# Patient Record
Sex: MALE | Race: WHITE | NOT HISPANIC OR LATINO | Employment: FULL TIME | URBAN - METROPOLITAN AREA
[De-identification: names, ages, dates, MRNs, and addresses within clinical notes are randomized per-mention and may not be internally consistent; named-entity substitution may affect disease eponyms.]

---

## 2020-05-26 ENCOUNTER — OCCUPATIONAL HEALTH (OUTPATIENT)
Dept: URGENT CARE | Facility: CLINIC | Age: 35
End: 2020-05-26
Payer: COMMERCIAL

## 2020-05-26 DIAGNOSIS — Z02.83 ENCOUNTER FOR DRUG SCREENING: Primary | ICD-10-CM

## 2020-05-26 LAB — BREATH ALCOHOL: 0

## 2020-05-26 PROCEDURE — 80305 DRUG TEST PRSMV DIR OPT OBS: CPT | Mod: S$GLB,,, | Performed by: FAMILY MEDICINE

## 2020-05-26 PROCEDURE — 82075 POCT BREATH ALCOHOL TEST: ICD-10-PCS | Mod: S$GLB,,, | Performed by: FAMILY MEDICINE

## 2020-05-26 PROCEDURE — 82075 ASSAY OF BREATH ETHANOL: CPT | Mod: S$GLB,,, | Performed by: FAMILY MEDICINE

## 2020-05-26 PROCEDURE — 80305 OOH NON-DOT DRUG SCREEN: ICD-10-PCS | Mod: S$GLB,,, | Performed by: FAMILY MEDICINE

## 2020-08-13 ENCOUNTER — LAB VISIT (OUTPATIENT)
Dept: PRIMARY CARE CLINIC | Facility: OTHER | Age: 35
End: 2020-08-13
Attending: INTERNAL MEDICINE
Payer: OTHER GOVERNMENT

## 2020-08-13 DIAGNOSIS — R05.9 COUGH: ICD-10-CM

## 2020-08-13 PROCEDURE — U0003 INFECTIOUS AGENT DETECTION BY NUCLEIC ACID (DNA OR RNA); SEVERE ACUTE RESPIRATORY SYNDROME CORONAVIRUS 2 (SARS-COV-2) (CORONAVIRUS DISEASE [COVID-19]), AMPLIFIED PROBE TECHNIQUE, MAKING USE OF HIGH THROUGHPUT TECHNOLOGIES AS DESCRIBED BY CMS-2020-01-R: HCPCS

## 2020-08-15 LAB — SARS-COV-2 RNA RESP QL NAA+PROBE: NOT DETECTED

## 2020-08-18 DIAGNOSIS — M25.511 RIGHT SHOULDER PAIN: Primary | ICD-10-CM

## 2021-04-29 ENCOUNTER — PATIENT MESSAGE (OUTPATIENT)
Dept: RESEARCH | Facility: HOSPITAL | Age: 36
End: 2021-04-29

## 2025-07-01 ENCOUNTER — HOSPITAL ENCOUNTER (EMERGENCY)
Facility: HOSPITAL | Age: 40
Discharge: HOME | End: 2025-07-02
Attending: EMERGENCY MEDICINE
Payer: MEDICAID

## 2025-07-01 DIAGNOSIS — T14.8XXA PUNCTURE WOUND: ICD-10-CM

## 2025-07-01 PROCEDURE — 99284 EMERGENCY DEPT VISIT MOD MDM: CPT

## 2025-07-02 VITALS
DIASTOLIC BLOOD PRESSURE: 77 MMHG | WEIGHT: 311 LBS | RESPIRATION RATE: 18 BRPM | BODY MASS INDEX: 43.54 KG/M2 | HEART RATE: 98 BPM | OXYGEN SATURATION: 98 % | SYSTOLIC BLOOD PRESSURE: 168 MMHG | HEIGHT: 71 IN | TEMPERATURE: 99 F

## 2025-07-02 PROCEDURE — 90471 IMMUNIZATION ADMIN: CPT | Performed by: EMERGENCY MEDICINE

## 2025-07-02 PROCEDURE — 25000003 PHARM REV CODE 250: Performed by: EMERGENCY MEDICINE

## 2025-07-02 PROCEDURE — 63600175 PHARM REV CODE 636 W HCPCS: Performed by: EMERGENCY MEDICINE

## 2025-07-02 PROCEDURE — 90715 TDAP VACCINE 7 YRS/> IM: CPT | Performed by: EMERGENCY MEDICINE

## 2025-07-02 RX ORDER — DOXYCYCLINE 100 MG/1
100 CAPSULE ORAL
Status: COMPLETED | OUTPATIENT
Start: 2025-07-02 | End: 2025-07-02

## 2025-07-02 RX ORDER — BACITRACIN 500 [USP'U]/G
OINTMENT TOPICAL
Status: COMPLETED | OUTPATIENT
Start: 2025-07-02 | End: 2025-07-02

## 2025-07-02 RX ORDER — DOXYCYCLINE 100 MG/1
100 CAPSULE ORAL 2 TIMES DAILY
Qty: 7 CAPSULE | Refills: 0 | Status: SHIPPED | OUTPATIENT
Start: 2025-07-02 | End: 2025-07-06

## 2025-07-02 RX ADMIN — DOXYCYCLINE 100 MG: 100 CAPSULE ORAL at 12:07

## 2025-07-02 RX ADMIN — BACITRACIN: 500 OINTMENT TOPICAL at 12:07

## 2025-07-02 RX ADMIN — CLOSTRIDIUM TETANI TOXOID ANTIGEN (FORMALDEHYDE INACTIVATED), CORYNEBACTERIUM DIPHTHERIAE TOXOID ANTIGEN (FORMALDEHYDE INACTIVATED), BORDETELLA PERTUSSIS TOXOID ANTIGEN (GLUTARALDEHYDE INACTIVATED), BORDETELLA PERTUSSIS FILAMENTOUS HEMAGGLUTININ ANTIGEN (FORMALDEHYDE INACTIVATED), BORDETELLA PERTUSSIS PERTACTIN ANTIGEN, AND BORDETELLA PERTUSSIS FIMBRIAE 2/3 ANTIGEN 0.5 ML: 5; 2; 2.5; 5; 3; 5 INJECTION, SUSPENSION INTRAMUSCULAR at 12:07

## 2025-07-02 NOTE — ED PROVIDER NOTES
Chief complaint:  Puncture Wound (Patient states that he was gator hunting in [place] when his LLE was punctured by what he believes to be a piece of rebar)      HPI:  Christpoh Flores is a 40 y.o. male presenting with a puncture wound to the left distal leg above the ankle.  This occurred approximately 1 hour ago while he was standing and water, fishing in [facility].  He felt a sharp piercing sensation and exited the water with apparent puncture wound.  This was stressed at a local fire station and patient presents POV to the emergency department.  He is walking without difficulty.  He denies visualized foreign body although there is some soiling of the wound from the object it appeared to be some metal.    ROS: As per HPI and below:  No numbness, weakness, other fall or injury, ankle or foot pain.    Review of patient's allergies indicates:  No Known Allergies    Discharge Medication List as of 7/2/2025  1:06 AM        START taking these medications    Details   doxycycline (VIBRAMYCIN) 100 MG Cap Take 1 capsule (100 mg total) by mouth 2 (two) times daily. for 4 days, Starting Wed 7/2/2025, Until Sun 7/6/2025, Normal           CONTINUE these medications which have NOT CHANGED    Details   naproxen (NAPROSYN) 500 MG tablet Take 1 tablet (500 mg total) by mouth 2 (two) times daily with meals., Starting Mon 9/2/2019, Print             PMH:  As per HPI and below:  No past medical history on file.  No past surgical history on file.    Social History     Socioeconomic History    Marital status:        No family history on file.    Physical Exam:    Vitals:    07/02/25 0030   BP: (!) 168/77   Pulse: 98   Resp: 18   Temp:      GENERAL:  No apparent distress.  Alert.    HEENT:  Moist mucous membranes.  Normocephalic and atraumatic.    NECK:  No swelling.  Midline trachea.   CARDIOVASCULAR:  Regular rate and rhythm.  2+ radial pulses.    PULMONARY:  Lungs clear to auscultation bilaterally.  No wheezes, rales, or  rhonci.    EXTREMITIES:  Warm and well perfused.  Brisk capillary refill.  5 mm puncture wound to the left distal leg above the ankle as pictured below.  There is some swelling of the wound by debris.  No visualized foreign body.  No visualized penetration past the fascia, tendon involvement.  2+ DP and PT pulses in the feet with 5/5 strength sensation in the distal lower extremities.  Full active range of motion of the left ankle without restriction or pain.  NEUROLOGICAL:  Normal mental status.  Appropriate and conversant.    SKIN:  Puncture wound as detailed below.              Labs Reviewed - No data to display    Discharge Medication List as of 7/2/2025  1:06 AM        START taking these medications    Details   doxycycline (VIBRAMYCIN) 100 MG Cap Take 1 capsule (100 mg total) by mouth 2 (two) times daily. for 4 days, Starting Wed 7/2/2025, Until Sun 7/6/2025, Normal           CONTINUE these medications which have NOT CHANGED    Details   naproxen (NAPROSYN) 500 MG tablet Take 1 tablet (500 mg total) by mouth 2 (two) times daily with meals., Starting Mon 9/2/2019, Print             Orders Placed This Encounter   Procedures    X-Ray Ankle Complete Left       Imaging Results              X-Ray Ankle Complete Left (Final result)  Result time 07/02/25 00:46:57      Final result by Madeline Heck MD (07/02/25 00:46:57)                   Impression:      No acute findings.      Electronically signed by: Madeline Heck  Date:    07/02/2025  Time:    00:46               Narrative:    EXAMINATION:  XR ANKLE COMPLETE 3 VIEW LEFT    CLINICAL HISTORY:  Other injury of unspecified body region, initial encounter    TECHNIQUE:  AP, lateral and oblique views of the left ankle were performed.    COMPARISON:  None    FINDINGS:  No acute fracture or dislocation.  Joint spaces and ankle mortise are maintained.                                      ED Course as of 07/02/25 0301   Wed Jul 02, 2025   0025 XR L ankle:  No  fracture or dislocation.  No foreign body adjacent to wound above ankle joint.  Incidental dorsal foreign body noted.  Findings are related to patient.    (Independently interpreted by me) [MR]      ED Course User Index  [MR] Kamar Howe MD       MDM:    40 y.o. male with left leg puncture wound.  Prophylactic antibiotic initiated after wound care including irrigation.  No clear sign of foreign body although foreign body still possible and discussed with the patient.  High-risk for infection given nature wound discussed.  He is to follow up for reassessment.  Tetanus immunization updated.  He declines analgesia.  No distal neurovascular compromise or sign of significant tendon injury.  Return precautions reviewed.    Diagnoses:    1. Left leg puncture wound       Kamar Howe MD  07/02/25 030

## 2025-07-03 ENCOUNTER — HOSPITAL ENCOUNTER (EMERGENCY)
Facility: HOSPITAL | Age: 40
Discharge: HOME | End: 2025-07-03
Attending: STUDENT IN AN ORGANIZED HEALTH CARE EDUCATION/TRAINING PROGRAM
Payer: MEDICAID

## 2025-07-03 VITALS
WEIGHT: 311 LBS | HEIGHT: 71 IN | TEMPERATURE: 98 F | DIASTOLIC BLOOD PRESSURE: 138 MMHG | OXYGEN SATURATION: 98 % | SYSTOLIC BLOOD PRESSURE: 180 MMHG | HEART RATE: 110 BPM | RESPIRATION RATE: 18 BRPM | BODY MASS INDEX: 43.54 KG/M2

## 2025-07-03 DIAGNOSIS — L03.116 CELLULITIS OF LEFT LEG: Primary | ICD-10-CM

## 2025-07-03 DIAGNOSIS — S91.302A OPEN WOUND OF LEFT FOOT: ICD-10-CM

## 2025-07-03 LAB
ABSOLUTE EOSINOPHIL (SMH): 0.01 K/UL
ABSOLUTE MONOCYTE (SMH): 2.4 K/UL (ref 0.3–1)
ABSOLUTE NEUTROPHIL COUNT (SMH): 8.3 K/UL (ref 1.8–7.7)
ALBUMIN SERPL-MCNC: 4.5 G/DL (ref 3.5–5.2)
ALP SERPL-CCNC: 50 UNIT/L (ref 55–135)
ALT SERPL-CCNC: 74 UNIT/L (ref 10–44)
ANION GAP (SMH): 13 MMOL/L (ref 8–16)
AST SERPL-CCNC: 32 UNIT/L (ref 10–40)
BASOPHILS # BLD AUTO: 0.04 K/UL
BASOPHILS NFR BLD AUTO: 0.3 %
BILIRUB SERPL-MCNC: 1.3 MG/DL (ref 0.1–1)
BILIRUB UR QL STRIP.AUTO: NEGATIVE
BUN SERPL-MCNC: 8 MG/DL (ref 6–20)
CALCIUM SERPL-MCNC: 9.3 MG/DL (ref 8.7–10.5)
CHLORIDE SERPL-SCNC: 98 MMOL/L (ref 95–110)
CLARITY UR: CLEAR
CO2 SERPL-SCNC: 20 MMOL/L (ref 23–29)
COLOR UR AUTO: ABNORMAL
CREAT SERPL-MCNC: 1.1 MG/DL (ref 0.5–1.4)
ERYTHROCYTE [DISTWIDTH] IN BLOOD BY AUTOMATED COUNT: 12.8 % (ref 11.5–14.5)
GFR SERPLBLD CREATININE-BSD FMLA CKD-EPI: >60 ML/MIN/1.73/M2
GLUCOSE SERPL-MCNC: 164 MG/DL (ref 70–110)
GLUCOSE UR QL STRIP: NEGATIVE
HCT VFR BLD AUTO: 55.4 % (ref 40–54)
HGB BLD-MCNC: 19.2 GM/DL (ref 14–18)
HGB UR QL STRIP: NEGATIVE
HYALINE CASTS UR QL AUTO: 18 /LPF (ref 0–1)
IMM GRANULOCYTES # BLD AUTO: 0.06 K/UL (ref 0–0.04)
IMM GRANULOCYTES NFR BLD AUTO: 0.5 % (ref 0–0.5)
KETONES UR QL STRIP: NEGATIVE
LDH SERPL L TO P-CCNC: 1.99 MMOL/L (ref 0.5–2.2)
LEUKOCYTE ESTERASE UR QL STRIP: NEGATIVE
LYMPHOCYTES # BLD AUTO: 1.35 K/UL (ref 1–4.8)
MAGNESIUM SERPL-MCNC: 1.9 MG/DL (ref 1.6–2.6)
MCH RBC QN AUTO: 32.3 PG (ref 27–31)
MCHC RBC AUTO-ENTMCNC: 34.7 G/DL (ref 32–36)
MCV RBC AUTO: 93 FL (ref 82–98)
MICROSCOPIC COMMENT: ABNORMAL
NITRITE UR QL STRIP: NEGATIVE
NUCLEATED RBC (/100WBC) (SMH): 0 /100 WBC
PH UR STRIP: 6 [PH]
PHOSPHATE SERPL-MCNC: 2.9 MG/DL (ref 2.7–4.5)
PLATELET # BLD AUTO: 162 K/UL (ref 150–450)
PMV BLD AUTO: 11.2 FL (ref 9.2–12.9)
POTASSIUM SERPL-SCNC: 4.5 MMOL/L (ref 3.5–5.1)
PROT SERPL-MCNC: 8.2 GM/DL (ref 6–8.4)
PROT UR QL STRIP: ABNORMAL
RBC # BLD AUTO: 5.94 M/UL (ref 4.6–6.2)
RBC #/AREA URNS AUTO: 1 /HPF
RELATIVE EOSINOPHIL (SMH): 0.1 % (ref 0–8)
RELATIVE LYMPHOCYTE (SMH): 11.1 % (ref 18–48)
RELATIVE MONOCYTE (SMH): 19.8 % (ref 4–15)
RELATIVE NEUTROPHIL (SMH): 68.2 % (ref 38–73)
SAMPLE: NORMAL
SODIUM SERPL-SCNC: 131 MMOL/L (ref 136–145)
SP GR UR STRIP: 1.02
SQUAMOUS #/AREA URNS AUTO: <1 /HPF
UROBILINOGEN UR STRIP-ACNC: NEGATIVE EU/DL
WBC # BLD AUTO: 12.12 K/UL (ref 3.9–12.7)
WBC #/AREA URNS AUTO: 1 /HPF

## 2025-07-03 PROCEDURE — 82310 ASSAY OF CALCIUM: CPT

## 2025-07-03 PROCEDURE — 84100 ASSAY OF PHOSPHORUS: CPT

## 2025-07-03 PROCEDURE — 36415 COLL VENOUS BLD VENIPUNCTURE: CPT

## 2025-07-03 PROCEDURE — 85025 COMPLETE CBC W/AUTO DIFF WBC: CPT

## 2025-07-03 PROCEDURE — 83735 ASSAY OF MAGNESIUM: CPT

## 2025-07-03 PROCEDURE — 81003 URINALYSIS AUTO W/O SCOPE: CPT

## 2025-07-03 PROCEDURE — 25500020 PHARM REV CODE 255: Performed by: STUDENT IN AN ORGANIZED HEALTH CARE EDUCATION/TRAINING PROGRAM

## 2025-07-03 PROCEDURE — 99285 EMERGENCY DEPT VISIT HI MDM: CPT | Mod: 25

## 2025-07-03 PROCEDURE — 87040 BLOOD CULTURE FOR BACTERIA: CPT

## 2025-07-03 PROCEDURE — 25000003 PHARM REV CODE 250: Performed by: STUDENT IN AN ORGANIZED HEALTH CARE EDUCATION/TRAINING PROGRAM

## 2025-07-03 RX ORDER — LEVOFLOXACIN 750 MG/1
750 TABLET, FILM COATED ORAL DAILY
Qty: 5 TABLET | Refills: 0 | Status: SHIPPED | OUTPATIENT
Start: 2025-07-03 | End: 2025-07-08

## 2025-07-03 RX ORDER — DOXYCYCLINE 100 MG/1
100 CAPSULE ORAL 2 TIMES DAILY
Qty: 6 CAPSULE | Refills: 0 | Status: SHIPPED | OUTPATIENT
Start: 2025-07-03 | End: 2025-07-06

## 2025-07-03 RX ORDER — LEVOFLOXACIN 750 MG/1
750 TABLET, FILM COATED ORAL ONCE
Status: COMPLETED | OUTPATIENT
Start: 2025-07-03 | End: 2025-07-03

## 2025-07-03 RX ORDER — CEPHALEXIN 500 MG/1
500 CAPSULE ORAL 4 TIMES DAILY
Qty: 20 CAPSULE | Refills: 0 | Status: SHIPPED | OUTPATIENT
Start: 2025-07-03 | End: 2025-07-08

## 2025-07-03 RX ORDER — CEPHALEXIN 250 MG/1
500 CAPSULE ORAL ONCE
Status: COMPLETED | OUTPATIENT
Start: 2025-07-03 | End: 2025-07-03

## 2025-07-03 RX ADMIN — IOHEXOL 100 ML: 350 INJECTION, SOLUTION INTRAVENOUS at 06:07

## 2025-07-03 RX ADMIN — CEPHALEXIN 500 MG: 250 CAPSULE ORAL at 07:07

## 2025-07-03 RX ADMIN — LEVOFLOXACIN 750 MG: 750 TABLET, FILM COATED ORAL at 07:07

## 2025-07-03 NOTE — ED PROVIDER NOTES
Encounter Date: 7/3/2025       History     Chief Complaint   Patient presents with    Wound Check     Pt seen 2 days ago for puncture wound on left foot and started on antibiotics. Pt states this morning it is red and draining.      HPI  Review of patient's allergies indicates:  No Known Allergies  No past medical history on file.  No past surgical history on file.  No family history on file.  Social History[1]  Review of Systems    Physical Exam     Initial Vitals [07/03/25 1335]   BP Pulse Resp Temp SpO2   (!) 172/107 (!) 121 18 98.4 °F (36.9 °C) 97 %      MAP       --         Physical Exam    ED Course   Procedures  Labs Reviewed   CULTURE, BLOOD   CULTURE, BLOOD   CBC W/ AUTO DIFFERENTIAL    Narrative:     The following orders were created for panel order CBC auto differential.  Procedure                               Abnormality         Status                     ---------                               -----------         ------                     CBC with Differential[[id number]]                                                         Please view results for these tests on the individual orders.   COMPREHENSIVE METABOLIC PANEL   MAGNESIUM   PHOSPHORUS   URINALYSIS, REFLEX TO URINE CULTURE   CBC WITH DIFFERENTIAL   POCT LACTATE          Imaging Results    None          Medications - No data to display  Medical Decision Making  Amount and/or Complexity of Data Reviewed  Radiology: ordered.                                      Clinical Impression:  Final diagnoses:  [S91.302A] Open wound of left foot                       [1]         Gifty Huffman MD  07/03/25 3220

## 2025-07-03 NOTE — ED PROVIDER NOTES
Encounter Date: 7/3/2025       History     Chief Complaint   Patient presents with    Wound Check     Pt seen 2 days ago for puncture wound on left foot and started on antibiotics. Pt states this morning it is red and draining.      JENNIFER Flores is a 40 year old male patient who presents to the ED for wound check. Patient was initially seen in the ED on 7/1 after sustaining a puncture wound to his left lower leg while fishing in [facility]. He believes he was punctured by a piece of rebar. Patient given Tdap in ED and prescribed doxycycline. Returning to the ED today for wound re-evaluation. Patient reports increased swelling, redness, warmth, and wound drainage. Reporting continued pain at the site. Denies any fevers, chill, body aches. No N/V/D. No weakness in his leg, sensation changes.     Review of patient's allergies indicates:  No Known Allergies  No past medical history on file.  No past surgical history on file.  No family history on file.  Social History[1]  Review of Systems   Skin:  Positive for rash and wound.   All other systems reviewed and are negative.    Physical Exam     Initial Vitals [07/03/25 1335]   BP Pulse Resp Temp SpO2   (!) 172/107 (!) 121 18 98.4 °F (36.9 °C) 97 %      MAP       --         Physical Exam    Nursing note and vitals reviewed.  Constitutional: He appears well-developed and well-nourished.   HENT:   Head: Normocephalic and atraumatic.   Right Ear: External ear normal.   Left Ear: External ear normal.   Nose: Nose normal.   Eyes: Conjunctivae and EOM are normal. Right eye exhibits no discharge. Left eye exhibits no discharge. No scleral icterus.   Neck: Neck supple.   Normal range of motion.  Cardiovascular:  Normal rate, regular rhythm, normal heart sounds and intact distal pulses.           No murmur heard.  Pulmonary/Chest: Breath sounds normal. No respiratory distress.   Abdominal: Abdomen is soft. He exhibits no distension. There is no abdominal tenderness.  There is no rebound and no guarding.   Musculoskeletal:         General: Tenderness present. Normal range of motion.      Cervical back: Normal range of motion and neck supple.      Comments: Puncture wound to distal left medial leg. Scant serosanguinous drainage from wound with pressure. Tenderness to palpation surrounding wound. Surrounding erythema to midcalf and ankle. Minimal induration. No fluctuance, crepitus on palpation. Patient able to plantarflex and dorsiflex ankle without issue, distal sensation intact,  2+DP.      Neurological: He is alert and oriented to person, place, and time. GCS score is 15. GCS eye subscore is 4. GCS verbal subscore is 5. GCS motor subscore is 6.   Skin: Skin is warm and dry.               ED Course   Procedures  Labs Reviewed   COMPREHENSIVE METABOLIC PANEL - Abnormal       Result Value    Sodium 131 (*)     Potassium 4.5      Chloride 98      CO2 20 (*)     Glucose 164 (*)     BUN 8      Creatinine 1.1      Calcium 9.3      Protein Total 8.2      Albumin 4.5      Bilirubin Total 1.3 (*)     ALP 50 (*)     AST 32      ALT 74 (*)     Anion Gap 13      eGFR >60     URINALYSIS, REFLEX TO URINE CULTURE - Abnormal    Color, UA Orange (*)     Appearance, UA Clear      Spec Grav UA 1.020      pH, UA 6.0      Protein, UA 2+ (*)     Glucose, UA Negative      Ketones, UA Negative      Blood, UA Negative      Bilirubin, UA Negative      Urobilinogen, UA Negative      Nitrites, UA Negative      Leukocyte Esterase, UA Negative     CBC WITH DIFFERENTIAL - Abnormal    WBC 12.12      RBC 5.94      Hgb 19.2 (*)     Hct 55.4 (*)     MCV 93      MCH 32.3 (*)     MCHC 34.7      RDW 12.8      Platelet Count 162      MPV 11.2      Nucleated RBC 0      Neut % 68.2      Lymph % 11.1 (*)     Mono % 19.8 (*)     Eos % 0.1      Basophil % 0.3      Imm Grans % 0.5      Neut # 8.3 (*)     Lymph # 1.35      Mono # 2.40 (*)     Eos # 0.01      Baso # 0.04      Imm Grans # 0.06 (*)    URINALYSIS MICROSCOPIC -  Abnormal    RBC, UA 1      WBC, UA 1      Squamous Epithelial Cells, UA <1      Hyaline Casts, UA 18 (*)     Microscopic Comment       CULTURE, BLOOD - Normal    CULTURE, BLOOD ([facility]) No Growth After 36 Hours     CULTURE, BLOOD - Normal    CULTURE, BLOOD ([facility]) No Growth After 36 Hours     MAGNESIUM - Normal    Magnesium 1.9     PHOSPHORUS - Normal    Phosphorus Level 2.9     CBC W/ AUTO DIFFERENTIAL    Narrative:     The following orders were created for panel order CBC auto differential.  Procedure                               Abnormality         Status                     ---------                               -----------         ------                     CBC with Differential[[id number]]        Abnormal            Final result                 Please view results for these tests on the individual orders.   EXTRA TUBES    Narrative:     The following orders were created for panel order EXTRA TUBES.  Procedure                               Abnormality         Status                     ---------                               -----------         ------                     Light Green Top Hold[[id number]]                            In process                 Lavender Top Hold[[id number]]                               In process                   Please view results for these tests on the individual orders.   LIGHT GREEN TOP HOLD   LAVENDER TOP HOLD   ISTAT LACTATE    POC Lactate 1.99      Sample VENOUS            Imaging Results              CT Leg (Tibia-Fibula) Wtih Contrast Left (Final result)  Result time 07/03/25 18:40:23      Final result by Madeline Heck MD (07/03/25 18:40:23)                   Impression:      Small Baker cyst with partial rupture/leakage.    Normal muscle volume and attenuation.    Mild superficial infrapatellar and heel subcutaneous edema and swelling.  Subcutaneous soft tissues are otherwise unremarkable.  No fluid collection or abscess.    No acute fracture, dislocation, or  traumatic malalignment.  Joint spaces are maintained.      Electronically signed by: Madeline Maria R  Date:    07/03/2025  Time:    18:40               Narrative:    EXAMINATION:  CT LEG (TIBIA-FIBULA) WITH CONTRAST LEFT    CLINICAL HISTORY:  Soft tissue infection suspected, lower leg, xray done;    TECHNIQUE:  CT left leg with contrast.  See electronic medical record for contrast dose material    COMPARISON:  None    FINDINGS:  See below                                       X-Ray Ankle Complete Left (Final result)  Result time 07/03/25 14:39:27      Final result by Juan Manuel Armijo MD (07/03/25 14:39:27)                   Impression:      No acute fracture or destructive osseous lesion.      Electronically signed by: Juan Manuel Armijo  Date:    07/03/2025  Time:    14:39               Narrative:    EXAMINATION:  XR ANKLE COMPLETE 3 VIEW LEFT    CLINICAL HISTORY:  Unspecified open wound, left foot, initial encounter    FINDINGS:  Three views of the left ankle show no acute fracture, dislocation or destructive osseous lesion.  The ankle mortise is intact, with the joint spaces preserved.  No abnormal periosteal reaction.  Bone mineralization is normal, with no radiopaque foreign bodies.  There is soft tissue swelling.                                       X-Ray Chest 1 View (Final result)  Result time 07/03/25 14:38:41      Final result by Terence Latif MD (07/03/25 14:38:41)                   Impression:      No acute cardiac or pulmonary process.      Electronically signed by: Terence Latif  Date:    07/03/2025  Time:    14:38               Narrative:    CLINICAL HISTORY:  ([id number])39 y/o  (1985) M    Sepsis;    TECHNIQUE:  (A#[id number], exam time 7/3/2025 14:35)    XR CHEST 1 VIEW IMG34    COMPARISON:  None available.    FINDINGS:  The lungs are clear. Costophrenic angles are seen without effusion. No pneumothorax is identified. The heart is normal in size. The mediastinum is within normal limits.  Osseous structures appear within normal limits. The visualized upper abdomen is unremarkable.                                       Medications   iohexoL (OMNIPAQUE 350) injection 100 mL (100 mLs Intravenous Given 7/3/25 1806)   levoFLOXacin tablet 750 mg (750 mg Oral Given 7/3/25 1950)   cephALEXin capsule 500 mg (500 mg Oral Given 7/3/25 1950)     Medical Decision Making  Amount and/or Complexity of Data Reviewed  Radiology: ordered.    Risk  Prescription drug management.      FLORIAN Hawthorne Gustine is a 40 year old male patient who presents to the ED for wound check. Puncture wound to distal left medial leg. Scant serosanguinous drainage from wound with pressure. Tenderness to palpation surrounding wound. Surrounding erythema to midcalf and ankle. Minimal induration. No fluctuance, crepitus on palpation. Patient able to plantarflex and dorsiflex ankle without issue, distal sensation intact,  2+DP. See photos above. Presentation suspicious for cellulitis. Must consider underlying deeper tissue infection including abscess, necrotizing fasciculitis, osteomyelitis. Lower suspicion for sepsis at this time. Workup included CBC, CMP, Mg, P, LA, UA, blood cultures x 2,  CXR, XR of left tib/fib. Patient without leukocytosis, WBC 12.12. H/H increased to 19.2/55.4. CO2 decreased to 20. Hyperglycemic to 164. T bili mildly elevated to 1.3, ALT to 74. Otherwise CMP, Mg, P without gross abnormalities. LA WNL. UA unremarkable. Soft tissue swelling on XR, otherwise unremarkable. CT ordered, demonstrated mild superficial infrapatellar and heel subcutaneous edema and swelling without fluid collection or abscess. Discussed test results with patient and wife. Expanding antibiotic coverage for cellulitis with water exposure. Discharged home with advice on symptomatic management, anticipatory guidance, and strict return precautions.     Gifty Huffman MD  07/05/2025 1:38 PM                                       Clinical  Impression:  Final diagnoses:  [S91.302A] Open wound of left foot  [L03.116] Cellulitis of left leg (Primary)          ED Disposition Condition    Discharge Stable          ED Prescriptions       Medication Sig Dispense Start Date End Date Auth. Provider    doxycycline (VIBRAMYCIN) 100 MG Cap Take 1 capsule (100 mg total) by mouth 2 (two) times daily. for 3 days 6 capsule 7/3/2025 7/6/2025 Gifty Huffman MD    levoFLOXacin (LEVAQUIN) 750 MG tablet Take 1 tablet (750 mg total) by mouth once daily. for 5 days 5 tablet 7/3/2025 7/8/2025 Gifty Huffman MD    cephALEXin (KEFLEX) 500 MG capsule Take 1 capsule (500 mg total) by mouth 4 (four) times daily. for 5 days 20 capsule 7/3/2025 7/8/2025 Gifty Huffman MD          Follow-up Information       Follow up With Specialties Details Why Contact Info Additional Information    [facility] - Emergency Dept Emergency Medicine  As needed, If symptoms worsen [address] [address] [phone] 1st floor                   [1]         Gifty Huffman MD  07/05/25 7392

## 2025-07-03 NOTE — FIRST PROVIDER EVALUATION
Emergency Department TeleTriage Encounter Note      CHIEF COMPLAINT    Chief Complaint   Patient presents with    Wound Check     Pt seen 2 days ago for puncture wound on left foot and started on antibiotics. Pt states this morning it is red and draining.        VITAL SIGNS   Initial Vitals [07/03/25 1335]   BP Pulse Resp Temp SpO2   (!) 172/107 (!) 121 18 98.4 °F (36.9 °C) 97 %      MAP       --            ALLERGIES    Review of patient's allergies indicates:  No Known Allergies    PROVIDER TRIAGE NOTE  Leg punctured by melodie two days ago and was seen and prescribed antibiotics. Area is now red and has drainage. He has had no fever.     Limited physical exam via telehealth: The patient is awake, alert, answering questions appropriately and is not in respiratory distress.  As the Teletriage provider, I performed an initial assessment and ordered appropriate labs and imaging studies, if any, to facilitate the patient's care once placed in the ED. Once a room is available, care and a full evaluation will be completed by an alternate ED provider.  Any additional orders and the final disposition will be determined by that provider.  All imaging and labs will not be followed-up by the Teletriage Team, including myself.          ORDERS  Labs Reviewed   CULTURE, BLOOD   CULTURE, BLOOD   CBC W/ AUTO DIFFERENTIAL    Narrative:     The following orders were created for panel order CBC auto differential.  Procedure                               Abnormality         Status                     ---------                               -----------         ------                     CBC with Differential[[id number]]                                                         Please view results for these tests on the individual orders.   COMPREHENSIVE METABOLIC PANEL   MAGNESIUM   PHOSPHORUS   URINALYSIS, REFLEX TO URINE CULTURE   CBC WITH DIFFERENTIAL   POCT LACTATE       ED Orders (720h ago, onward)      Start Ordered     Status  Ordering Provider    07/03/25 1738 07/03/25 1337  Lactic Acid Plasma #2  In 4 hours         Ordered POWER, SAMMY A    07/03/25 1338 07/03/25 1337  ED Preference List Used to Initiate Sepsis Orders  Until discontinued         Ordered POWER, SAMMY A    07/03/25 1338 07/03/25 1337  Blood culture x two cultures. Draw prior to antibiotics  Every 15 min      Comments: Aerobic and anaerobic     Order ID Start Status Ordering Provider   [id number] 07/03/25 1338 Ordered POWER, SAMMY A   [id number] 07/03/25 1353 Ordered POWER, SAMMY A       Ordered POWER, SAMMY A    07/03/25 1338 07/03/25 1337  CBC auto differential  STAT         Ordered POWER, SAMMY A    07/03/25 1338 07/03/25 1337  Comprehensive metabolic panel  STAT         Ordered POWER, SAMMY A    07/03/25 1338 07/03/25 1337  POCT Lactate #1  Once         Ordered POWER, SAMMY A    07/03/25 1338 07/03/25 1337  Magnesium  STAT         Ordered POWER, SAMMY A    07/03/25 1338 07/03/25 1337  Phosphorus  STAT         Ordered POWER, SAMMY A    07/03/25 1338 07/03/25 1337  X-Ray Chest 1 View  1 time imaging         Ordered POWER, SAMMY A    07/03/25 1338 07/03/25 1337  Bed rest  Until discontinued         Ordered POWER, SAMMY A    07/03/25 1338 07/03/25 1337  Cardiac Monitoring - Adult  Continuous        Comments: Notify Physician If:    Ordered POWER, SAMMY A    07/03/25 1338 07/03/25 1337  Pulse Oximetry Continuous  Continuous         Ordered POWER, SAMMY A    07/03/25 1338 07/03/25 1337  Strict intake and output  Until discontinued         Ordered POWER, SAMMY A    07/03/25 1338 07/03/25 1337  Urinalysis, Reflex to Urine Culture Urine, Clean Catch  STAT         Ordered POWER, SAMMY A    07/03/25 1338 07/03/25 1337  Saline lock IV  Once         Ordered POWER, SAMMY A    07/03/25 1338 07/03/25 1337  CBC with Differential  PROCEDURE ONCE         Ordered POWER, SAMMY A    Unscheduled 07/03/25 1337  Vital signs  Per comments       Comments: Every 15 minutes until SBP greater than 90 or MAP greater than 65, then every 30 minutes times one hours then every one hour    Ordered POWER, SAMMY A              Virtual Visit Note: The provider triage portion of this emergency department evaluation and documentation was performed via [employer], a HIPAA-compliant telemedicine application, in concert with a tele-presenter in the room. A face to face patient evaluation with one of my colleagues will occur once the patient is placed in an emergency department room.      DISCLAIMER: This note was prepared with [employer] voice recognition transcription software. Garbled syntax, mangled pronouns, and other bizarre constructions may be attributed to that software system.

## 2025-07-04 NOTE — DISCHARGE INSTRUCTIONS
Continue to take your doxycycline twice a day.  I am extending your therapy, prescribing an additional 3 days for a total of 7 days.  I am also sending you home with 2 additional antibiotics to help with other soft tissue infection bacteria:  Keflex and Levaquin.  Please take as prescribed.  You were given your 1st doses of Keflex and Levaquin here in the ED.    You may take ibuprofen and Tylenol to help with pain and swelling.  You may shower as normal, wash the wound with warm soapy water.  Avoid soaking in baths, tubs, pools until the wound is completely healed.    Please follow-up with your primary care provider.     Return to the emergency department if you develop any new or worsening symptoms such as worsening swelling, redness, warmth, severe/uncontrollable pain, you become weak in your leg or experienced numbness or tingling in your leg, fevers or chills, body aches, vomiting that will not stop, dizziness, confusion, passing out.

## 2025-07-08 LAB
BACTERIA BLD CULT: NORMAL
BACTERIA BLD CULT: NORMAL